# Patient Record
Sex: MALE | Race: WHITE | Employment: FULL TIME | URBAN - METROPOLITAN AREA
[De-identification: names, ages, dates, MRNs, and addresses within clinical notes are randomized per-mention and may not be internally consistent; named-entity substitution may affect disease eponyms.]

---

## 2018-02-15 ENCOUNTER — OFFICE VISIT (OUTPATIENT)
Dept: INTERNAL MEDICINE CLINIC | Age: 43
End: 2018-02-15

## 2018-02-15 VITALS
BODY MASS INDEX: 24.14 KG/M2 | RESPIRATION RATE: 19 BRPM | WEIGHT: 178.2 LBS | HEART RATE: 80 BPM | DIASTOLIC BLOOD PRESSURE: 80 MMHG | HEIGHT: 72 IN | OXYGEN SATURATION: 97 % | TEMPERATURE: 97.9 F | SYSTOLIC BLOOD PRESSURE: 120 MMHG

## 2018-02-15 DIAGNOSIS — L40.9 PSORIASIS: ICD-10-CM

## 2018-02-15 DIAGNOSIS — Z76.89 ENCOUNTER TO ESTABLISH CARE: Primary | ICD-10-CM

## 2018-02-15 RX ORDER — FLUOCINOLONE ACETONIDE 0.11 MG/ML
1 OIL TOPICAL
Qty: 118.28 ML | Refills: 1 | Status: SHIPPED | OUTPATIENT
Start: 2018-02-15 | End: 2022-05-16

## 2018-02-15 RX ORDER — FLUOCINONIDE TOPICAL SOLUTION USP, 0.05% 0.5 MG/ML
SOLUTION TOPICAL
Refills: 2 | COMMUNITY
Start: 2017-12-07 | End: 2018-02-15 | Stop reason: CLARIF

## 2018-02-15 RX ORDER — BETAMETHASONE DIPROPIONATE 0.5 MG/G
OINTMENT TOPICAL 2 TIMES DAILY
Qty: 45 G | Refills: 1 | Status: SHIPPED | OUTPATIENT
Start: 2018-02-15

## 2018-02-15 RX ORDER — BETAMETHASONE DIPROPIONATE 0.5 MG/G
OINTMENT TOPICAL 2 TIMES DAILY
COMMUNITY
End: 2018-02-15 | Stop reason: SDUPTHER

## 2018-02-15 RX ORDER — FLUOCINOLONE ACETONIDE 0.11 MG/ML
OIL TOPICAL
COMMUNITY
End: 2018-02-15 | Stop reason: SDUPTHER

## 2018-02-15 NOTE — PROGRESS NOTES
New Patient Evaluation    Frances Spaulding is a 36 y.o. male. They are here to establish care with the group and me as a primary care provider. he has seen Dr. Ayush Norris in the past.  The last visit was about a year ago. He has a history of psoriasis. He had seen [facility]      He has a history of fallen arches. He has seen a podiatrist.  He has inserts. Stable on this for now. He had labs in the past year. He thinks labs were normal.  No significant abnormalities that he is aware of. Patient Active Problem List    Diagnosis Date Noted    Psoriasis 02/15/2018     Current Outpatient Prescriptions   Medication Sig Dispense Refill    fluocinoNIDE (LIDEX) 0.05 % external solution APPLY TO SCALP DAILY AS NEEDED FOR PSORIASIS  2    fluocinolone 0.01 % external oil Apply  to affected area.  betamethasone dipropionate (DIPROLENE) 0.05 % ointment Apply  to affected area two (2) times a day. No Known Allergies  No past medical history on file. Past Surgical History:   Procedure Laterality Date    HX TONSILLECTOMY  18     Family History   Problem Relation Age of Onset   24 [facility] Breast Cancer Mother     Hypertension Mother     Hypertension Father      Social History   Substance Use Topics    Smoking status: Never Smoker    Smokeless tobacco: Never Used    Alcohol use 1.2 oz/week     2 Standard drinks or equivalent per week        Health Maintenance   Topic Date Due    DTaP/Tdap/Td series (1 - Tdap) 04/05/1995    Influenza Age 5 to Adult  08/01/2017       Review of Systems   Constitutional: Negative. Cardiovascular: Negative. Skin: Positive for rash (of psoriasis). Visit Vitals    /80 (BP 1 Location: Right arm, BP Patient Position: Sitting)    Pulse 80    Temp 97.9 °F (36.6 °C) (Oral)    Resp 19    Ht 6' 0.32\" (1.837 m)    Wt 178 lb 3.2 oz (80.8 kg)    SpO2 97%    BMI 23.95 kg/m2       Physical Exam   Constitutional: No distress.    Cardiovascular: Normal rate and regular rhythm. No murmur heard. Pulmonary/Chest: Effort normal and breath sounds normal. No respiratory distress. ASSESSMENT/PLAN    Diagnoses and all orders for this visit:    1. Encounter to establish care  -     CBC WITH AUTOMATED DIFF  -     METABOLIC PANEL, COMPREHENSIVE  -     LIPID PANEL    2. Psoriasis  -     REFERRAL TO DERMATOLOGY      Follow-up Disposition:  Return in 3 months (on 5/15/2018) for Full Physical - 30 minutes appointment.    -Discussed with the patient to continue the current plan of care. We will obtain baseline labwork and determine if any adjustments need to be done. We will also await the records of the previous PCP to ascertain further details of the patient's history. The patient agrees with and understands the plan of care. All questions have been answered.

## 2018-02-15 NOTE — MR AVS SNAPSHOT
[address] [place] [address] 
[phone] Patient: Luly Miles MRN: [id number] ZUK:[birth year] Visit Information Date & Time Provider Department Dept. Phone Encounter #  
 2/15/2018  9:30 AM Savannah Deleon MD [facility] [phone] [id number] Follow-up Instructions Return in 3 months (on 5/15/2018) for Full Physical - 30 minutes appointment. Upcoming Health Maintenance Date Due DTaP/Tdap/Td series (2 - Td) 1/13/2026 Allergies as of 2/15/2018  Review Complete On: 2/15/2018 By: Savannah Deleon MD  
 No Known Allergies Current Immunizations  Never Reviewed No immunizations on file. Not reviewed this visit You Were Diagnosed With   
  
 Codes Comments Encounter to establish care    -  Primary ICD-10-CM: Z76.89 
ICD-9-CM: V65.8 Psoriasis     ICD-10-CM: L40.9 ICD-9-CM: 696.1 Vitals BP Pulse Temp Resp Height(growth percentile) Weight(growth percentile) 120/80 (BP 1 Location: Right arm, BP Patient Position: Sitting) 80 97.9 °F (36.6 °C) (Oral) 19 6' 0.32\" (1.837 m) 178 lb 3.2 oz (80.8 kg) SpO2 BMI Smoking Status 97% 23.95 kg/m2 Never Smoker Vitals History BMI and BSA Data Body Mass Index Body Surface Area  
 23.95 kg/m 2 2.03 m 2 Preferred Pharmacy Pharmacy Name Phone [facility] [address] [place] [address] [phone] Your Updated Medication List  
  
   
This list is accurate as of: 2/15/18 10:16 AM.  Always use your most recent med list.  
  
  
  
  
 betamethasone dipropionate 0.05 % ointment Commonly known as:  [place] Apply  to affected area two (2) times a day. fluocinolone 0.01 % external oil Apply  to affected area. fluocinoNIDE 0.05 % external solution Commonly known as:  LIDEX APPLY TO SCALP DAILY AS NEEDED FOR PSORIASIS  
  
  
  
  
 We Performed the Following CBC WITH AUTOMATED DIFF [42194 CPT(R)] LIPID PANEL [50932 CPT(R)] METABOLIC PANEL, COMPREHENSIVE [88396 CPT(R)] REFERRAL TO DERMATOLOGY [REF19 Custom] Comments:  
 Please evaluate patient for psoriasis. Follow-up Instructions Return in 3 months (on 5/15/2018) for Full Physical - 30 minutes appointment. Referral Information Referral ID Referred By Referred To  
  
 [id number] Moraima Baer MD   
   [address] Phone: [phone] Fax: [phone] Visits Status Start Date End Date 1 New Request 2/15/18 2/15/19 If your referral has a status of pending review or denied, additional information will be sent to support the outcome of this decision. Introducing [facility] & HEALTH SERVICES! [facility] introduces Quantros patient portal. Now you can access parts of your medical record, email your doctor's office, and request medication refills online. 1. In your internet browser, go to [email or web address] 2. Click on the First Time User? Click Here link in the Sign In box. You will see the New Member Sign Up page. 3. Enter your Quantros Access Code exactly as it appears below. You will not need to use this code after youve completed the sign-up process. If you do not sign up before the expiration date, you must request a new code. · Quantros Access Code: [email or web address] Expires: 5/16/2018 10:16 AM 
 
4. Enter the last four digits of your Social Security Number (xxxx) and Date of Birth (mm/dd/yyyy) as indicated and click Submit. You will be taken to the next sign-up page. 5. Create a Pufferfisht ID. This will be your Quantros login ID and cannot be changed, so think of one that is secure and easy to remember. 6. Create a Quantros password. You can change your password at any time. 7. Enter your Password Reset Question and Answer.  This can be used at a later time if you forget your password. 8. Enter your e-mail address. You will receive e-mail notification when new information is available in [address] 9. Click Sign Up. You can now view and download portions of your medical record. 10. Click the Download Summary menu link to download a portable copy of your medical information. If you have questions, please visit the Frequently Asked Questions section of the Shortlist website. Remember, Shortlist is NOT to be used for urgent needs. For medical emergencies, dial 911. Now available from your iPhone and Android! Please provide this summary of care documentation to your next provider. Your primary care clinician is listed as Андрей Lopez. If you have any questions after today's visit, please call [phone].

## 2018-03-09 LAB
ALBUMIN SERPL-MCNC: 4.4 G/DL (ref 3.5–5.5)
ALBUMIN/GLOB SERPL: 1.6 {RATIO} (ref 1.2–2.2)
ALP SERPL-CCNC: 53 IU/L (ref 39–117)
ALT SERPL-CCNC: 32 IU/L (ref 0–44)
AST SERPL-CCNC: 22 IU/L (ref 0–40)
BASOPHILS # BLD AUTO: 0 X10E3/UL (ref 0–0.2)
BASOPHILS NFR BLD AUTO: 0 %
BILIRUB SERPL-MCNC: 1.1 MG/DL (ref 0–1.2)
BUN SERPL-MCNC: 17 MG/DL (ref 6–24)
BUN/CREAT SERPL: 15 (ref 9–20)
CALCIUM SERPL-MCNC: 9.3 MG/DL (ref 8.7–10.2)
CHLORIDE SERPL-SCNC: 103 MMOL/L (ref 96–106)
CHOLEST SERPL-MCNC: 156 MG/DL (ref 100–199)
CO2 SERPL-SCNC: 27 MMOL/L (ref 18–29)
CREAT SERPL-MCNC: 1.12 MG/DL (ref 0.76–1.27)
EOSINOPHIL # BLD AUTO: 0.1 X10E3/UL (ref 0–0.4)
EOSINOPHIL NFR BLD AUTO: 1 %
ERYTHROCYTE [DISTWIDTH] IN BLOOD BY AUTOMATED COUNT: 13.8 % (ref 12.3–15.4)
GFR SERPLBLD CREATININE-BSD FMLA CKD-EPI: 80 ML/MIN/1.73
GFR SERPLBLD CREATININE-BSD FMLA CKD-EPI: 93 ML/MIN/1.73
GLOBULIN SER CALC-MCNC: 2.7 G/DL (ref 1.5–4.5)
GLUCOSE SERPL-MCNC: 95 MG/DL (ref 65–99)
HCT VFR BLD AUTO: 39 % (ref 37.5–51)
HDLC SERPL-MCNC: 36 MG/DL
HGB BLD-MCNC: 13 G/DL (ref 13–17.7)
IMM GRANULOCYTES # BLD: 0 X10E3/UL (ref 0–0.1)
IMM GRANULOCYTES NFR BLD: 0 %
LDLC SERPL CALC-MCNC: 101 MG/DL (ref 0–99)
LYMPHOCYTES # BLD AUTO: 2.4 X10E3/UL (ref 0.7–3.1)
LYMPHOCYTES NFR BLD AUTO: 35 %
MCH RBC QN AUTO: 28.9 PG (ref 26.6–33)
MCHC RBC AUTO-ENTMCNC: 33.3 G/DL (ref 31.5–35.7)
MCV RBC AUTO: 87 FL (ref 79–97)
MONOCYTES # BLD AUTO: 0.5 X10E3/UL (ref 0.1–0.9)
MONOCYTES NFR BLD AUTO: 8 %
NEUTROPHILS # BLD AUTO: 3.9 X10E3/UL (ref 1.4–7)
NEUTROPHILS NFR BLD AUTO: 56 %
PLATELET # BLD AUTO: 243 X10E3/UL (ref 150–379)
POTASSIUM SERPL-SCNC: 4.7 MMOL/L (ref 3.5–5.2)
PROT SERPL-MCNC: 7.1 G/DL (ref 6–8.5)
RBC # BLD AUTO: 4.5 X10E6/UL (ref 4.14–5.8)
SODIUM SERPL-SCNC: 142 MMOL/L (ref 134–144)
TRIGL SERPL-MCNC: 97 MG/DL (ref 0–149)
VLDLC SERPL CALC-MCNC: 19 MG/DL (ref 5–40)
WBC # BLD AUTO: 6.9 X10E3/UL (ref 3.4–10.8)

## 2018-04-13 ENCOUNTER — OFFICE VISIT (OUTPATIENT)
Dept: INTERNAL MEDICINE CLINIC | Age: 43
End: 2018-04-13

## 2018-04-13 VITALS
BODY MASS INDEX: 24.11 KG/M2 | HEIGHT: 72 IN | HEART RATE: 84 BPM | DIASTOLIC BLOOD PRESSURE: 70 MMHG | OXYGEN SATURATION: 95 % | SYSTOLIC BLOOD PRESSURE: 110 MMHG | RESPIRATION RATE: 19 BRPM | TEMPERATURE: 97.7 F | WEIGHT: 178 LBS

## 2018-04-13 DIAGNOSIS — Z00.00 WELL ADULT EXAM: Primary | ICD-10-CM

## 2018-04-13 DIAGNOSIS — L40.9 PSORIASIS: ICD-10-CM

## 2018-04-13 RX ORDER — CALCIPOTRIENE AND BETAMETHASONE DIPROPIONATE 50; .5 UG/G; MG/G
SUSPENSION TOPICAL
Refills: 3 | COMMUNITY
Start: 2018-03-19 | End: 2022-05-16

## 2018-04-13 RX ORDER — SELENIUM SULFIDE 2.5 MG/100ML
LOTION TOPICAL
Refills: 3 | COMMUNITY
Start: 2018-03-16 | End: 2022-05-16

## 2018-04-13 NOTE — MR AVS SNAPSHOT
[address] 
[phone] Patient: Jose Keller MRN: [id number] KMY:[birth year] Visit Information Date & Time Provider Department Dept. Phone Encounter #  
 4/13/2018 10:30 AM Gamal Hernandez MD [facility] [id number] Follow-up Instructions Return in about 1 year (around 4/13/2019) for Full Physical - 30 minutes appointment. Upcoming Health Maintenance Date Due DTaP/Tdap/Td series (2 - Td) 1/13/2026 Allergies as of 4/13/2018  Review Complete On: 4/13/2018 By: Cara Oleary No Known Allergies Current Immunizations  Never Reviewed No immunizations on file. Not reviewed this visit You Were Diagnosed With   
  
 Codes Comments Well adult exam    -  Primary ICD-10-CM: Z00.00 ICD-9-CM: V70.0 Vitals BP Pulse Temp Resp Height(growth percentile) Weight(growth percentile) 110/70 (BP 1 Location: Right arm, BP Patient Position: Sitting) 84 97.7 °F (36.5 °C) (Oral) 19 6' 0.32\" (1.837 m) 178 lb (80.7 kg) SpO2 BMI Smoking Status 95% 23.93 kg/m2 Never Smoker Vitals History BMI and BSA Data Body Mass Index Body Surface Area  
 23.93 kg/m 2 2.03 m 2 Preferred Pharmacy Pharmacy Name Phone [facility] [address] [place] [address] [phone] Your Updated Medication List  
  
   
This list is accurate as of 4/13/18 11:08 AM.  Always use your most recent med list.  
  
  
  
  
 betamethasone dipropionate 0.05 % ointment Commonly known as:  [place] Apply  to affected area two (2) times a day. fluocinolone 0.01 % external oil Apply 1 Dose to affected area daily as needed. selenium sulfide 2.5 % lotion APPLY 1 APPLICATION TO AFFECTED AREA ON FACE AND SCALP TWICE A DAY  
  
 TACLONEX 0.005-0.064 % Susp Generic drug:  Betamethasone-Calcipotriene APPLY 1 APPLICATION TO AFFECTED AREA ON SCALP, EARS, AND BODY TWICE A DAY Follow-up Instructions Return in about 1 year (around 4/13/2019) for Full Physical - 30 minutes appointment. Patient Instructions Well Visit, Ages 25 to 48: Care Instructions Your Care Instructions Physical exams can help you stay healthy. Your doctor has checked your overall health and may have suggested ways to take good care of yourself. He or she also may have recommended tests. At home, you can help prevent illness with healthy eating, regular exercise, and other steps. Follow-up care is a key part of your treatment and safety. Be sure to make and go to all appointments, and call your doctor if you are having problems. It's also a good idea to know your test results and keep a list of the medicines you take. How can you care for yourself at home? · Reach and stay at a healthy weight. This will lower your risk for many problems, such as obesity, diabetes, heart disease, and high blood pressure. · Get at least 30 minutes of physical activity on most days of the week. Walking is a good choice. You also may want to do other activities, such as running, swimming, cycling, or playing tennis or team sports. Discuss any changes in your exercise program with your doctor. · Do not smoke or allow others to smoke around you. If you need help quitting, talk to your doctor about stop-smoking programs and medicines. These can increase your chances of quitting for good. · Talk to your doctor about whether you have any risk factors for sexually transmitted infections (STIs). Having one sex partner (who does not have STIs and does not have sex with anyone else) is a good way to avoid these infections. · Use birth control if you do not want to have children at this time. Talk with your doctor about the choices available and what might be best for you. · Protect your skin from too much sun. When you're outdoors from 10 a.m. to 4 p.m., stay in the shade or cover up with clothing and a hat with a wide brim. Wear sunglasses that block UV rays. Even when it's cloudy, put broad-spectrum sunscreen (SPF 30 or higher) on any exposed skin. · See a dentist one or two times a year for checkups and to have your teeth cleaned. · Wear a seat belt in the car. · Drink alcohol in moderation, if at all. That means no more than 2 drinks a day for men and 1 drink a day for women. Follow your doctor's advice about when to have certain tests. These tests can spot problems early. For everyone · Cholesterol. Have the fat (cholesterol) in your blood tested after age 21. Your doctor will tell you how often to have this done based on your age, family history, or other things that can increase your risk for heart disease. · Blood pressure. Have your blood pressure checked during a routine doctor visit. Your doctor will tell you how often to check your blood pressure based on your age, your blood pressure results, and other factors. · Vision. Talk with your doctor about how often to have a glaucoma test. 
· Diabetes. Ask your doctor whether you should have tests for diabetes. · Colon cancer. Have a test for colon cancer at age 48. You may have one of several tests. If you are younger than 48, you may need a test earlier if you have any risk factors. Risk factors include whether you already had a precancerous polyp removed from your colon or whether your parent, brother, sister, or child has had colon cancer. For women · Breast exam and mammogram. Talk to your doctor about when you should have a clinical breast exam and a mammogram. Medical experts differ on whether and how often women under 50 should have these tests. Your doctor can help you decide what is right for you. · Pap test and pelvic exam. Begin Pap tests at age 24.  A Pap test is the best way to find cervical cancer. The test often is part of a pelvic exam. Ask how often to have this test. 
· Tests for sexually transmitted infections (STIs). Ask whether you should have tests for STIs. You may be at risk if you have sex with more than one person, especially if your partners do not wear condoms. For men · Tests for sexually transmitted infections (STIs). Ask whether you should have tests for STIs. You may be at risk if you have sex with more than one person, especially if you do not wear a condom. · Testicular cancer exam. Ask your doctor whether you should check your testicles regularly. · Prostate exam. Talk to your doctor about whether you should have a blood test (called a PSA test) for prostate cancer. Experts differ on whether and when men should have this test. Some experts suggest it if you are older than 39 and are -American or have a father or brother who got prostate cancer when he was younger than 72. When should you call for help? Watch closely for changes in your health, and be sure to contact your doctor if you have any problems or symptoms that concern you. Where can you learn more? Go to http://justice-arlet.info/. Enter P072 in the search box to learn more about \"Well Visit, Ages 25 to 48: Care Instructions. \" Current as of: May 12, 2017 Content Version: 11.4 © 8745-0715 Healthwise, Incorporated. Care instructions adapted under license by [employer] (which disclaims liability or warranty for this information). If you have questions about a medical condition or this instruction, always ask your healthcare professional. [place] [address] any warranty or liability for your use of this information. Introducing [facility] & HEALTH SERVICES! [facility] introduces Share Practice patient portal. Now you can access parts of your medical record, email your doctor's office, and request medication refills online. 1. In your internet browser, go to [email or web address] 2. Click on the First Time User? Click Here link in the Sign In box. You will see the New Member Sign Up page. 3. Enter your Presidio Access Code exactly as it appears below. You will not need to use this code after youve completed the sign-up process. If you do not sign up before the expiration date, you must request a new code. · Presidio Access Code: [email or web address] Expires: 5/16/2018 11:16 AM 
 
4. Enter the last four digits of your Social Security Number (xxxx) and Date of Birth (mm/dd/yyyy) as indicated and click Submit. You will be taken to the next sign-up page. 5. Create a Stitcht ID. This will be your Presidio login ID and cannot be changed, so think of one that is secure and easy to remember. 6. Create a Presidio password. You can change your password at any time. 7. Enter your Password Reset Question and Answer. This can be used at a later time if you forget your password. 8. Enter your e-mail address. You will receive e-mail notification when new information is available in [address] 9. Click Sign Up. You can now view and download portions of your medical record. 10. Click the Download Summary menu link to download a portable copy of your medical information. If you have questions, please visit the Frequently Asked Questions section of the Presidio website. Remember, Presidio is NOT to be used for urgent needs. For medical emergencies, dial 911. Now available from your iPhone and Android! Please provide this summary of care documentation to your next provider. Your primary care clinician is listed as Blossom Murphy. If you have any questions after today's visit, please call [phone].

## 2018-04-13 NOTE — PATIENT INSTRUCTIONS

## 2018-04-27 NOTE — PROGRESS NOTES
Comprehensive Physical Examination    Vandana Ardon is a 39 y.o. male. he presents for a comprehensive physical examination. The patient denies any acute complaints. He feels well today. He has been treating his psoriasis as well. He will follow up with dermatology. No other issues to report. Labs reviewed, normal except for a low HDL. He reports that he will increase his exercise. Patient Active Problem List    Diagnosis Date Noted    Psoriasis 02/15/2018     Current Outpatient Prescriptions   Medication Sig Dispense Refill    TACLONEX 0.005-0.064 % susp APPLY 1 APPLICATION TO AFFECTED AREA ON SCALP, EARS, AND BODY TWICE A DAY  3    selenium sulfide 2.5 % lotion APPLY 1 APPLICATION TO AFFECTED AREA ON FACE AND SCALP TWICE A DAY  3    fluocinolone 0.01 % external oil Apply 1 Dose to affected area daily as needed. 118.28 mL 1    betamethasone dipropionate (DIPROLENE) 0.05 % ointment Apply  to affected area two (2) times a day. 45 g 1     No Known Allergies  No past medical history on file. Past Surgical History:   Procedure Laterality Date    HX TONSILLECTOMY  18     Family History   Problem Relation Age of Onset   [facility] Breast [facility] Mother     Hypertension Mother     Hypertension Father      Social History   Substance Use Topics    Smoking status: Never Smoker    Smokeless tobacco: Never Used    Alcohol use 1.2 oz/week     2 Standard drinks or equivalent per week        Health Maintenance   Topic Date Due    Influenza Age 5 to Adult  08/01/2018    DTaP/Tdap/Td series (2 - Td) 01/13/2026         Review of Systems   Constitutional: Negative. Respiratory: Negative. Cardiovascular: Negative. Gastrointestinal: Negative.           Visit Vitals    /70 (BP 1 Location: Right arm, BP Patient Position: Sitting)    Pulse 84    Temp 97.7 °F (36.5 °C) (Oral)    Resp 19    Ht 6' 0.32\" (1.837 m)    Wt 178 lb (80.7 kg)    SpO2 95%    BMI 23.93 kg/m2       Physical Exam Constitutional: No distress. HENT:   Mouth/Throat: Oropharynx is clear and moist.   Neck: Neck supple. Cardiovascular: Normal rate. Pulmonary/Chest: Effort normal and breath sounds normal.   Abdominal: Soft. Bowel sounds are normal.   Musculoskeletal: He exhibits no tenderness. ASSESSMENT/PLAN  Diagnoses and all orders for this visit:    1. Well adult exam- Increase exercise, monitor diet    2. Psoriasis- Continue present care. Follow-up Disposition:  Return in about 1 year (around 4/13/2019) for Full Physical - 30 minutes appointment.

## 2021-01-12 ENCOUNTER — OFFICE VISIT (OUTPATIENT)
Dept: INTERNAL MEDICINE CLINIC | Age: 46
End: 2021-01-12
Payer: COMMERCIAL

## 2021-01-12 VITALS
RESPIRATION RATE: 14 BRPM | SYSTOLIC BLOOD PRESSURE: 100 MMHG | TEMPERATURE: 97.7 F | HEART RATE: 65 BPM | OXYGEN SATURATION: 96 % | BODY MASS INDEX: 25.41 KG/M2 | WEIGHT: 187.6 LBS | DIASTOLIC BLOOD PRESSURE: 80 MMHG | HEIGHT: 72 IN

## 2021-01-12 DIAGNOSIS — L40.9 PSORIASIS: ICD-10-CM

## 2021-01-12 DIAGNOSIS — Z00.00 WELL ADULT EXAM: Primary | ICD-10-CM

## 2021-01-12 LAB
ALBUMIN SERPL-MCNC: 4.3 G/DL (ref 3.5–5)
ALBUMIN/GLOB SERPL: 1.3 {RATIO} (ref 1.1–2.2)
ALP SERPL-CCNC: 65 U/L (ref 45–117)
ALT SERPL-CCNC: 43 U/L (ref 12–78)
ANION GAP SERPL CALC-SCNC: 2 MMOL/L (ref 5–15)
AST SERPL-CCNC: 26 U/L (ref 15–37)
BASOPHILS # BLD: 0 K/UL (ref 0–0.1)
BASOPHILS NFR BLD: 1 % (ref 0–1)
BILIRUB SERPL-MCNC: 1.1 MG/DL (ref 0.2–1)
BUN SERPL-MCNC: 13 MG/DL (ref 6–20)
BUN/CREAT SERPL: 12 (ref 12–20)
CALCIUM SERPL-MCNC: 9.3 MG/DL (ref 8.5–10.1)
CHLORIDE SERPL-SCNC: 105 MMOL/L (ref 97–108)
CHOLEST SERPL-MCNC: 175 MG/DL
CO2 SERPL-SCNC: 32 MMOL/L (ref 21–32)
CREAT SERPL-MCNC: 1.13 MG/DL (ref 0.7–1.3)
DIFFERENTIAL METHOD BLD: NORMAL
EOSINOPHIL # BLD: 0.2 K/UL (ref 0–0.4)
EOSINOPHIL NFR BLD: 3 % (ref 0–7)
ERYTHROCYTE [DISTWIDTH] IN BLOOD BY AUTOMATED COUNT: 12.3 % (ref 11.5–14.5)
GLOBULIN SER CALC-MCNC: 3.4 G/DL (ref 2–4)
GLUCOSE SERPL-MCNC: 89 MG/DL (ref 65–100)
HCT VFR BLD AUTO: 40.8 % (ref 36.6–50.3)
HDLC SERPL-MCNC: 40 MG/DL
HDLC SERPL: 4.4 {RATIO} (ref 0–5)
HGB BLD-MCNC: 13.7 G/DL (ref 12.1–17)
IMM GRANULOCYTES # BLD AUTO: 0 K/UL (ref 0–0.04)
IMM GRANULOCYTES NFR BLD AUTO: 0 % (ref 0–0.5)
LDLC SERPL CALC-MCNC: 112.6 MG/DL (ref 0–100)
LIPID PROFILE,FLP: ABNORMAL
LYMPHOCYTES # BLD: 2.3 K/UL (ref 0.8–3.5)
LYMPHOCYTES NFR BLD: 44 % (ref 12–49)
MCH RBC QN AUTO: 29.5 PG (ref 26–34)
MCHC RBC AUTO-ENTMCNC: 33.6 G/DL (ref 30–36.5)
MCV RBC AUTO: 87.9 FL (ref 80–99)
MONOCYTES # BLD: 0.4 K/UL (ref 0–1)
MONOCYTES NFR BLD: 8 % (ref 5–13)
NEUTS SEG # BLD: 2.4 K/UL (ref 1.8–8)
NEUTS SEG NFR BLD: 44 % (ref 32–75)
NRBC # BLD: 0 K/UL (ref 0–0.01)
NRBC BLD-RTO: 0 PER 100 WBC
PLATELET # BLD AUTO: 252 K/UL (ref 150–400)
PMV BLD AUTO: 10.2 FL (ref 8.9–12.9)
POTASSIUM SERPL-SCNC: 4.9 MMOL/L (ref 3.5–5.1)
PROT SERPL-MCNC: 7.7 G/DL (ref 6.4–8.2)
PSA SERPL-MCNC: 1 NG/ML (ref 0.01–4)
RBC # BLD AUTO: 4.64 M/UL (ref 4.1–5.7)
SODIUM SERPL-SCNC: 139 MMOL/L (ref 136–145)
TRIGL SERPL-MCNC: 112 MG/DL (ref ?–150)
VLDLC SERPL CALC-MCNC: 22.4 MG/DL
WBC # BLD AUTO: 5.3 K/UL (ref 4.1–11.1)

## 2021-01-12 PROCEDURE — 99396 PREV VISIT EST AGE 40-64: CPT | Performed by: INTERNAL MEDICINE

## 2021-01-12 NOTE — PROGRESS NOTES
Comprehensive Physical Examination    Darryl Mansfield is a 54 y.o. male. he presents for a comprehensive physical examination. He has psoriasis. Follows up with dermatology. He follows up with derm. Using Cerave. Otherwise feeling well, no complaints. Reviewed health maintenance, addressed outstanding items. Patient Active Problem List    Diagnosis Date Noted    Psoriasis 02/15/2018     Current Outpatient Medications   Medication Sig Dispense Refill    TACLONEX 0.005-0.064 % susp APPLY 1 APPLICATION TO AFFECTED AREA ON SCALP, EARS, AND BODY TWICE A DAY  3    selenium sulfide 2.5 % lotion APPLY 1 APPLICATION TO AFFECTED AREA ON FACE AND SCALP TWICE A DAY  3    fluocinolone 0.01 % external oil Apply 1 Dose to affected area daily as needed. 118.28 mL 1    betamethasone dipropionate (DIPROLENE) 0.05 % ointment Apply  to affected area two (2) times a day. 45 g 1     No Known Allergies  No past medical history on file. Past Surgical History:   Procedure Laterality Date    HX TONSILLECTOMY  17     Family History   Problem Relation Age of Onset   [facility] Breast Cancer Mother     Hypertension Mother     Hypertension Father      Social History     Tobacco Use    Smoking status: Never Smoker    Smokeless tobacco: Never Used   Substance Use Topics    Alcohol use: Yes     Alcohol/week: 2.0 standard drinks     Types: 2 Standard drinks or equivalent per week        Health Maintenance   Topic Date Due    Flu Vaccine (1) 09/01/2020    Lipid Screen  03/08/2023    DTaP/Tdap/Td series (2 - Td) 01/13/2026    Pneumococcal 0-64 years  Aged Out         Review of Systems   Constitutional: Negative. HENT: Negative. Respiratory: Negative for cough. Cardiovascular: Negative for chest pain and palpitations. Gastrointestinal: Negative. Musculoskeletal: Negative. All other systems reviewed and are negative.         Visit Vitals  /80 (BP 1 Location: Left arm, BP Patient Position: Sitting) Pulse 65   Temp 97.7 °F (36.5 °C) (Temporal)   Resp 14   Ht 6' 0.32\" (1.837 m)   Wt 187 lb 9.6 oz (85.1 kg)   SpO2 96%   BMI 25.22 kg/m²       Physical Exam  Neck:      Musculoskeletal: Normal range of motion. Cardiovascular:      Rate and Rhythm: Normal rate and regular rhythm. Heart sounds: No murmur. Pulmonary:      Effort: Pulmonary effort is normal.      Breath sounds: Normal breath sounds. Abdominal:      General: Bowel sounds are normal.      Palpations: Abdomen is soft. Lymphadenopathy:      Cervical: No cervical adenopathy. ASSESSMENT/PLAN  Diagnoses and all orders for this visit:    1. Well adult exam  -     PSA, DIAGNOSTIC (PROSTATE SPECIFIC AG); Future  -     LIPID PANEL; Future  -     CBC WITH AUTOMATED DIFF; Future  -     METABOLIC PANEL, COMPREHENSIVE; Future    2. Psoriasis      Follow-up and Dispositions    · Return in about 1 year (around 1/12/2022) for Full Physical - 30 minutes appointment.

## 2021-01-12 NOTE — PROGRESS NOTES
Chief Complaint   Patient presents with    Complete Physical     Reviewed record in preparation for visit and have obtained necessary documentation. Identified pt with two pt identifiers(name and ). Health Maintenance Due   Topic    Flu Vaccine (1)         Chief Complaint   Patient presents with    Complete Physical        Wt Readings from Last 3 Encounters:   21 187 lb 9.6 oz (85.1 kg)   18 178 lb (80.7 kg)   02/15/18 178 lb 3.2 oz (80.8 kg)     Temp Readings from Last 3 Encounters:   21 97.7 °F (36.5 °C) (Temporal)   18 97.7 °F (36.5 °C) (Oral)   02/15/18 97.9 °F (36.6 °C) (Oral)     BP Readings from Last 3 Encounters:   21 100/80   18 110/70   02/15/18 120/80     Pulse Readings from Last 3 Encounters:   21 65   18 84   02/15/18 80           Learning Assessment:  :     Learning Assessment 2/15/2018   PRIMARY LEARNER Patient   HIGHEST LEVEL OF EDUCATION - PRIMARY LEARNER  > 4 YEARS OF COLLEGE   BARRIERS PRIMARY LEARNER NONE   PRIMARY LANGUAGE ENGLISH   LEARNER PREFERENCE PRIMARY OTHER (COMMENT)   ANSWERED BY pt   RELATIONSHIP SELF       Depression Screening:  :     3 most recent PHQ Screens 2021   Little interest or pleasure in doing things Not at all   Feeling down, depressed, irritable, or hopeless Not at all   Total Score PHQ 2 0       Fall Risk Assessment:  :     No flowsheet data found. Abuse Screening:  :     Abuse Screening Questionnaire 2/15/2018   Do you ever feel afraid of your partner? N   Are you in a relationship with someone who physically or mentally threatens you? N   Is it safe for you to go home?  Y       Coordination of Care Questionnaire:  :     1) Have you been to an emergency room, urgent care clinic since your last visit? no   Hospitalized since your last visit? no             2) Have you seen or consulted any other health care providers outside of [address] [place] [address] since your last visit? no  (Include any pap smears or colon screenings in this section.)    3) Do you have an Advance Directive on file? no    4) Are you interested in receiving information on Advance Directives? NO      Patient is accompanied by self I have received verbal consent from Tayler Goodwin to discuss any/all medical information while they are present in the room. Reviewed record  In preparation for visit and have obtained necessary documentation.

## 2021-06-21 ENCOUNTER — OFFICE VISIT (OUTPATIENT)
Dept: INTERNAL MEDICINE CLINIC | Age: 46
End: 2021-06-21
Payer: COMMERCIAL

## 2021-06-21 VITALS
HEART RATE: 65 BPM | TEMPERATURE: 97.5 F | OXYGEN SATURATION: 97 % | DIASTOLIC BLOOD PRESSURE: 80 MMHG | RESPIRATION RATE: 14 BRPM | WEIGHT: 187.8 LBS | HEIGHT: 72 IN | BODY MASS INDEX: 25.44 KG/M2 | SYSTOLIC BLOOD PRESSURE: 100 MMHG

## 2021-06-21 DIAGNOSIS — R20.2 NUMBNESS AND TINGLING OF RIGHT ARM: Primary | ICD-10-CM

## 2021-06-21 DIAGNOSIS — R20.0 NUMBNESS AND TINGLING OF RIGHT ARM: Primary | ICD-10-CM

## 2021-06-21 PROCEDURE — 93000 ELECTROCARDIOGRAM COMPLETE: CPT | Performed by: INTERNAL MEDICINE

## 2021-06-21 PROCEDURE — 99213 OFFICE O/P EST LOW 20 MIN: CPT | Performed by: INTERNAL MEDICINE

## 2021-06-21 NOTE — PROGRESS NOTES
Acute Care Note    Lubna Crowe is 51 y.o. male. he presents for evaluation of Tingling (upper body right side)      Tingling on the right side of his upper body 4 days ago after walking his dog. Called EMS, checked out, normal parameters (blood sugars, BP) but no EKG was done. The symptoms subsided at that time. However, the next day, he had similar symptoms. Called his father in law who is an internist.  Discussed the need to take an aspirin. He did this and felt better. Of note, he has had some upper back discomfort as well as some discomfort to his neck on the right side. He has a family history of CAD in his grandfather (age 48). His father has a history of high cholesterol. We discussed the need to evaluate this further. Prior to Admission medications    Medication Sig Start Date End Date Taking? Authorizing Provider   TACLONEX 0.005-0.064 % susp APPLY 1 APPLICATION TO AFFECTED AREA ON SCALP, EARS, AND BODY TWICE A DAY  Patient not taking: Reported on 6/21/2021 3/19/18   Provider, Historical   selenium sulfide 2.5 % lotion APPLY 1 APPLICATION TO AFFECTED AREA ON FACE AND SCALP TWICE A DAY  Patient not taking: Reported on 6/21/2021 3/16/18   Provider, Historical   fluocinolone 0.01 % external oil Apply 1 Dose to affected area daily as needed. Patient not taking: Reported on 6/21/2021 2/15/18   Naila Reeves MD   betamethasone dipropionate (DIPROLENE) 0.05 % ointment Apply  to affected area two (2) times a day. Patient not taking: Reported on 6/21/2021 2/15/18   Naila Reeves MD         Patient Active Problem List   Diagnosis Code    Psoriasis L40.9         Review of Systems   Constitutional: Negative. Respiratory: Negative. Cardiovascular: Negative. Neurological: Positive for tingling.          Visit Vitals  /80 (BP 1 Location: Left arm, BP Patient Position: Sitting, BP Cuff Size: Adult)   Pulse 65   Temp 97.5 °F (36.4 °C) (Temporal)   Resp 14   Ht 6' 0.32\" (1.837 m)   Wt 187 lb 12.8 oz (85.2 kg)   SpO2 97%   BMI 25.25 kg/m²       Physical Exam  Constitutional:       Appearance: He is well-developed. Cardiovascular:      Rate and Rhythm: Normal rate and regular rhythm. Pulmonary:      Effort: Pulmonary effort is normal.      Breath sounds: Normal breath sounds. EKG: Negative for ischemia. ASSESSMENT/PLAN  Diagnoses and all orders for this visit:    1. Numbness and tingling of right arm - Discussed with the patient that his symptoms appear to be more consistent with cervical radiculopathy but given his family history, we need to rule out CAD and anginal equivalent. EKG negative for ischemia, we will order a stress test as well. Follow up results. -     AMB POC EKG ROUTINE W/ 12 LEADS, INTER & REP  -     EXERCISE CARDIAC STRESS TEST; Future         Advised the patient to call back or return to office if symptoms worsen/change/persist.   Discussed expected course/resolution/complications of diagnosis in detail with patient. Medication risks/benefits/costs/interactions/alternatives discussed with patient. The patient was given an after visit summary which includes diagnoses, current medications, & vitals. They expressed understanding with the diagnosis and plan.

## 2021-06-28 ENCOUNTER — HOSPITAL ENCOUNTER (OUTPATIENT)
Dept: NON INVASIVE DIAGNOSTICS | Age: 46
Discharge: HOME | End: 2021-06-28
Attending: INTERNAL MEDICINE
Payer: COMMERCIAL

## 2021-06-28 VITALS
BODY MASS INDEX: 25.33 KG/M2 | DIASTOLIC BLOOD PRESSURE: 77 MMHG | SYSTOLIC BLOOD PRESSURE: 146 MMHG | HEIGHT: 72 IN | WEIGHT: 187 LBS

## 2021-06-28 DIAGNOSIS — R20.0 NUMBNESS AND TINGLING OF RIGHT ARM: ICD-10-CM

## 2021-06-28 DIAGNOSIS — R20.2 NUMBNESS AND TINGLING OF RIGHT ARM: ICD-10-CM

## 2021-06-28 LAB
STRESS ANGINA INDEX: 0
STRESS BASELINE DIAS BP: 72 MMHG
STRESS BASELINE HR: 70 BPM
STRESS BASELINE SYS BP: 121 MMHG
STRESS ESTIMATED WORKLOAD: 11.1 METS
STRESS EXERCISE DUR MIN: NORMAL
STRESS PEAK DIAS BP: 83 MMHG
STRESS PEAK SYS BP: 168 MMHG
STRESS PERCENT HR ACHIEVED: 99 %
STRESS POST PEAK HR: 171 BPM
STRESS RATE PRESSURE PRODUCT: NORMAL BPM*MMHG
STRESS ST DEPRESSION: 0 MM
STRESS ST ELEVATION: 0 MM
STRESS STAGE 1 BP: NORMAL MMHG
STRESS STAGE 1 HR: 104 BPM
STRESS STAGE 2 BP: NORMAL MMHG
STRESS STAGE 2 HR: 125 BPM
STRESS STAGE 3 HR: 157 BPM
STRESS STAGE 4 HR: 169 BPM
STRESS STAGE RECOVERY 1 BP: NORMAL MMHG
STRESS STAGE RECOVERY 1 HR: 111 BPM
STRESS STAGE RECOVERY 2 BP: NORMAL MMHG
STRESS STAGE RECOVERY 2 HR: 101 BPM
STRESS TARGET HR: 173 BPM

## 2021-06-28 PROCEDURE — 93016 CV STRESS TEST SUPVJ ONLY: CPT | Performed by: INTERNAL MEDICINE

## 2021-06-28 PROCEDURE — 93017 CV STRESS TEST TRACING ONLY: CPT

## 2021-06-28 PROCEDURE — 93018 CV STRESS TEST I&R ONLY: CPT | Performed by: INTERNAL MEDICINE

## 2022-03-19 PROBLEM — L40.9 PSORIASIS: Status: ACTIVE | Noted: 2018-02-15

## 2022-05-16 ENCOUNTER — OFFICE VISIT (OUTPATIENT)
Dept: INTERNAL MEDICINE CLINIC | Age: 47
End: 2022-05-16
Payer: COMMERCIAL

## 2022-05-16 VITALS
HEIGHT: 72 IN | DIASTOLIC BLOOD PRESSURE: 80 MMHG | OXYGEN SATURATION: 98 % | BODY MASS INDEX: 25.38 KG/M2 | TEMPERATURE: 97.8 F | SYSTOLIC BLOOD PRESSURE: 120 MMHG | WEIGHT: 187.4 LBS | RESPIRATION RATE: 16 BRPM | HEART RATE: 65 BPM

## 2022-05-16 DIAGNOSIS — L40.9 PSORIASIS: ICD-10-CM

## 2022-05-16 DIAGNOSIS — Z11.59 ENCOUNTER FOR HEPATITIS C SCREENING TEST FOR LOW RISK PATIENT: ICD-10-CM

## 2022-05-16 DIAGNOSIS — Z30.09 VASECTOMY EVALUATION: ICD-10-CM

## 2022-05-16 DIAGNOSIS — Z12.11 COLON CANCER SCREENING: ICD-10-CM

## 2022-05-16 DIAGNOSIS — Z12.5 PROSTATE CANCER SCREENING: ICD-10-CM

## 2022-05-16 DIAGNOSIS — Z00.00 WELL ADULT EXAM: Primary | ICD-10-CM

## 2022-05-16 PROCEDURE — 99396 PREV VISIT EST AGE 40-64: CPT | Performed by: INTERNAL MEDICINE

## 2022-05-16 NOTE — PROGRESS NOTES
Comprehensive Physical Examination    Frantz Pope is a 49 y.o. male. he presents for a comprehensive physical examination. He reports feeling well, no complaints. He has been working on increasing his activity and eating healthy. Psoriasis is stable. No issues at this time. He is asking about a vasectomy. Discussed referral to urology. Due for colonoscopy at this time. Referral placed. Patient Active Problem List    Diagnosis Date Noted    Psoriasis 02/15/2018     Current Outpatient Medications   Medication Sig Dispense Refill    TACLONEX 0.005-0.064 % susp APPLY 1 APPLICATION TO AFFECTED AREA ON SCALP, EARS, AND BODY TWICE A DAY (Patient not taking: Reported on 6/21/2021)  3    selenium sulfide 2.5 % lotion APPLY 1 APPLICATION TO AFFECTED AREA ON FACE AND SCALP TWICE A DAY (Patient not taking: Reported on 6/21/2021)  3    fluocinolone 0.01 % external oil Apply 1 Dose to affected area daily as needed. (Patient not taking: Reported on 6/21/2021) 118.28 mL 1    betamethasone dipropionate (DIPROLENE) 0.05 % ointment Apply  to affected area two (2) times a day. (Patient not taking: Reported on 6/21/2021) 45 g 1     No Known Allergies  No past medical history on file. Past Surgical History:   Procedure Laterality Date    HX TONSILLECTOMY  18     Family History   Problem Relation Age of Onset   [facility] Breast Cancer Mother     Hypertension Mother     Hypertension Father      Social History     Tobacco Use    Smoking status: Never Smoker    Smokeless tobacco: Never Used   Substance Use Topics    Alcohol use:  Yes     Alcohol/week: 2.0 standard drinks     Types: 2 Standard drinks or equivalent per week        Health Maintenance   Topic Date Due    Hepatitis C Screening  Never done    Colorectal Cancer Screening Combo  Never done    Depression Screen  01/12/2022    Flu Vaccine (Season Ended) 09/01/2022    Lipid Screen  01/12/2026    DTaP/Tdap/Td series (2 - Td or Tdap) 01/13/2026  COVID-19 Vaccine  Completed    Pneumococcal 0-64 years  Aged Out         Review of Systems   Constitutional: Negative. Respiratory: Negative. Cardiovascular: Negative. Gastrointestinal: Negative. Visit Vitals  /80 (BP 1 Location: Left arm, BP Patient Position: Sitting, BP Cuff Size: Adult)   Pulse 65   Temp 97.8 °F (36.6 °C) (Temporal)   Resp 16   Ht 6' (1.829 m)   Wt 187 lb 6.4 oz (85 kg)   SpO2 98%   BMI 25.42 kg/m²       Physical Exam  Constitutional:       Appearance: Normal appearance. Cardiovascular:      Rate and Rhythm: Normal rate and regular rhythm. Pulses: Normal pulses. Heart sounds: Normal heart sounds. Pulmonary:      Effort: Pulmonary effort is normal.      Breath sounds: Normal breath sounds. Neurological:      Mental Status: He is alert. ASSESSMENT/PLAN  Diagnoses and all orders for this visit:    1. Well adult exam  -     LIPID PANEL; Future  -     METABOLIC PANEL, COMPREHENSIVE; Future  -     CBC WITH AUTOMATED DIFF; Future    2. Psoriasis    3. Prostate cancer screening  -     PSA SCREENING (SCREENING); Future    4. Colon cancer screening  -     REFERRAL TO GASTROENTEROLOGY    5. Encounter for hepatitis C screening test for low risk patient  -     HEPATITIS C AB; Future    6. Vasectomy evaluation  -     REFERRAL TO UROLOGY      Follow-up and Dispositions    · Return in about 1 year (around 5/16/2023) for Full Physical - 30 minutes appointment.

## 2022-05-16 NOTE — PROGRESS NOTES
Chief Complaint   Patient presents with    Complete Physical     Reviewed record in preparation for visit and have obtained necessary documentation. Identified pt with two pt identifiers(name and ). Health Maintenance Due   Topic    Hepatitis C Screening     Colorectal Cancer Screening Combo     Depression Screen          Chief Complaint   Patient presents with    Complete Physical        Wt Readings from Last 3 Encounters:   22 187 lb 6.4 oz (85 kg)   21 187 lb (84.8 kg)   21 187 lb 12.8 oz (85.2 kg)     Temp Readings from Last 3 Encounters:   22 97.8 °F (36.6 °C) (Temporal)   21 97.5 °F (36.4 °C) (Temporal)   21 97.7 °F (36.5 °C) (Temporal)     BP Readings from Last 3 Encounters:   22 120/80   21 (!) 146/77   21 100/80     Pulse Readings from Last 3 Encounters:   22 65   21 65   21 65           Learning Assessment:  :     Learning Assessment 2/15/2018   PRIMARY LEARNER Patient   HIGHEST LEVEL OF EDUCATION - PRIMARY LEARNER  > 4 YEARS OF COLLEGE   BARRIERS PRIMARY LEARNER NONE   PRIMARY LANGUAGE ENGLISH   LEARNER PREFERENCE PRIMARY OTHER (COMMENT)   ANSWERED BY pt   RELATIONSHIP SELF       Depression Screening:  :     3 most recent PHQ Screens 2022   Little interest or pleasure in doing things Not at all   Feeling down, depressed, irritable, or hopeless Not at all   Total Score PHQ 2 0       Fall Risk Assessment:  :     No flowsheet data found. Abuse Screening:  :     Abuse Screening Questionnaire 2022 2/15/2018   Do you ever feel afraid of your partner? N N   Are you in a relationship with someone who physically or mentally threatens you? N N   Is it safe for you to go home?  Y Y       Coordination of Care Questionnaire:  :     1) Have you been to an emergency room, urgent care clinic since your last visit? no   Hospitalized since your last visit? no             2) Have you seen or consulted any other health care providers outside of [address] [place] [address] since your last visit? no  (Include any pap smears or colon screenings in this section.)    3) Do you have an Advance Directive on file? no    4) Are you interested in receiving information on Advance Directives? NO      Patient is accompanied by self I have received verbal consent from Alexa Wade to discuss any/all medical information while they are present in the room. Reviewed record  In preparation for visit and have obtained necessary documentation.

## 2022-05-17 LAB
ALBUMIN SERPL-MCNC: 4.8 G/DL (ref 4–5)
ALBUMIN/GLOB SERPL: 1.9 {RATIO} (ref 1.2–2.2)
ALP SERPL-CCNC: 62 IU/L (ref 44–121)
ALT SERPL-CCNC: 24 IU/L (ref 0–44)
AST SERPL-CCNC: 24 IU/L (ref 0–40)
BASOPHILS # BLD AUTO: 0 X10E3/UL (ref 0–0.2)
BASOPHILS NFR BLD AUTO: 1 %
BILIRUB SERPL-MCNC: 0.5 MG/DL (ref 0–1.2)
BUN SERPL-MCNC: 14 MG/DL (ref 6–24)
BUN/CREAT SERPL: 12 (ref 9–20)
CALCIUM SERPL-MCNC: 9.4 MG/DL (ref 8.7–10.2)
CHLORIDE SERPL-SCNC: 102 MMOL/L (ref 96–106)
CHOLEST SERPL-MCNC: 175 MG/DL (ref 100–199)
CO2 SERPL-SCNC: 25 MMOL/L (ref 20–29)
CREAT SERPL-MCNC: 1.16 MG/DL (ref 0.76–1.27)
EGFR: 78 ML/MIN/1.73
EOSINOPHIL # BLD AUTO: 0.1 X10E3/UL (ref 0–0.4)
EOSINOPHIL NFR BLD AUTO: 1 %
ERYTHROCYTE [DISTWIDTH] IN BLOOD BY AUTOMATED COUNT: 12.6 % (ref 11.6–15.4)
GLOBULIN SER CALC-MCNC: 2.5 G/DL (ref 1.5–4.5)
GLUCOSE SERPL-MCNC: 90 MG/DL (ref 65–99)
HCT VFR BLD AUTO: 41.7 % (ref 37.5–51)
HCV AB S/CO SERPL IA: 0.2 S/CO RATIO (ref 0–0.9)
HDLC SERPL-MCNC: 35 MG/DL
HGB BLD-MCNC: 14 G/DL (ref 13–17.7)
IMM GRANULOCYTES # BLD AUTO: 0 X10E3/UL (ref 0–0.1)
IMM GRANULOCYTES NFR BLD AUTO: 1 %
LDLC SERPL CALC-MCNC: 121 MG/DL (ref 0–99)
LYMPHOCYTES # BLD AUTO: 2.1 X10E3/UL (ref 0.7–3.1)
LYMPHOCYTES NFR BLD AUTO: 37 %
MCH RBC QN AUTO: 30 PG (ref 26.6–33)
MCHC RBC AUTO-ENTMCNC: 33.6 G/DL (ref 31.5–35.7)
MCV RBC AUTO: 89 FL (ref 79–97)
MONOCYTES # BLD AUTO: 0.4 X10E3/UL (ref 0.1–0.9)
MONOCYTES NFR BLD AUTO: 7 %
NEUTROPHILS # BLD AUTO: 3.1 X10E3/UL (ref 1.4–7)
NEUTROPHILS NFR BLD AUTO: 53 %
PLATELET # BLD AUTO: 246 X10E3/UL (ref 150–450)
POTASSIUM SERPL-SCNC: 4.9 MMOL/L (ref 3.5–5.2)
PROT SERPL-MCNC: 7.3 G/DL (ref 6–8.5)
PSA SERPL-MCNC: 1.4 NG/ML (ref 0–4)
RBC # BLD AUTO: 4.67 X10E6/UL (ref 4.14–5.8)
SODIUM SERPL-SCNC: 141 MMOL/L (ref 134–144)
TRIGL SERPL-MCNC: 105 MG/DL (ref 0–149)
VLDLC SERPL CALC-MCNC: 19 MG/DL (ref 5–40)
WBC # BLD AUTO: 5.8 X10E3/UL (ref 3.4–10.8)

## 2023-05-23 RX ORDER — BETAMETHASONE DIPROPIONATE 0.05 %
OINTMENT (GRAM) TOPICAL 2 TIMES DAILY
COMMUNITY
Start: 2018-02-15